# Patient Record
Sex: FEMALE | Race: OTHER | HISPANIC OR LATINO
[De-identification: names, ages, dates, MRNs, and addresses within clinical notes are randomized per-mention and may not be internally consistent; named-entity substitution may affect disease eponyms.]

---

## 2017-03-17 PROBLEM — Z00.00 ENCOUNTER FOR PREVENTIVE HEALTH EXAMINATION: Status: ACTIVE | Noted: 2017-03-17

## 2017-03-31 ENCOUNTER — APPOINTMENT (OUTPATIENT)
Dept: UROGYNECOLOGY | Facility: CLINIC | Age: 67
End: 2017-03-31

## 2017-03-31 ENCOUNTER — APPOINTMENT (OUTPATIENT)
Dept: OBGYN | Facility: CLINIC | Age: 67
End: 2017-03-31

## 2017-03-31 VITALS
HEART RATE: 76 BPM | DIASTOLIC BLOOD PRESSURE: 70 MMHG | SYSTOLIC BLOOD PRESSURE: 130 MMHG | HEIGHT: 66 IN | WEIGHT: 175 LBS | BODY MASS INDEX: 28.12 KG/M2

## 2017-03-31 DIAGNOSIS — Z86.79 PERSONAL HISTORY OF OTHER DISEASES OF THE CIRCULATORY SYSTEM: ICD-10-CM

## 2017-03-31 DIAGNOSIS — R31.29 OTHER MICROSCOPIC HEMATURIA: ICD-10-CM

## 2017-03-31 PROBLEM — Z00.00 ENCOUNTER FOR PREVENTIVE HEALTH EXAMINATION: Noted: 2017-03-31

## 2017-03-31 LAB
BILIRUB UR QL STRIP: NORMAL
CLARITY UR: CLEAR
COLLECTION METHOD: NORMAL
GLUCOSE UR-MCNC: 250
HCG UR QL: 0.2 EU/DL
HGB UR QL STRIP.AUTO: NORMAL
KETONES UR-MCNC: NORMAL
LEUKOCYTE ESTERASE UR QL STRIP: NORMAL
NITRITE UR QL STRIP: NORMAL
PH UR STRIP: 7
PROT UR STRIP-MCNC: NORMAL
SP GR UR STRIP: 1.02

## 2017-03-31 RX ORDER — LOSARTAN POTASSIUM AND HYDROCHLOROTHIAZIDE 100; 25 MG/1; MG/1
100-25 TABLET, FILM COATED ORAL
Refills: 0 | Status: ACTIVE | COMMUNITY

## 2017-03-31 RX ORDER — SIMVASTATIN 20 MG/1
20 TABLET, FILM COATED ORAL
Refills: 0 | Status: ACTIVE | COMMUNITY

## 2017-03-31 RX ORDER — ASPIRIN 81 MG
81 TABLET, DELAYED RELEASE (ENTERIC COATED) ORAL
Refills: 0 | Status: ACTIVE | COMMUNITY

## 2017-03-31 RX ORDER — METOPROLOL TARTRATE 100 MG/1
100 TABLET, FILM COATED ORAL
Refills: 0 | Status: ACTIVE | COMMUNITY

## 2017-04-03 ENCOUNTER — RESULT REVIEW (OUTPATIENT)
Age: 67
End: 2017-04-03

## 2017-04-03 LAB
APPEARANCE: CLEAR
BACTERIA: NEGATIVE
BILIRUBIN URINE: NEGATIVE
BLOOD URINE: ABNORMAL
COLOR: YELLOW
GLUCOSE QUALITATIVE U: 250 MG/DL
HYALINE CASTS: 3 /LPF
KETONES URINE: NEGATIVE
LEUKOCYTE ESTERASE URINE: NEGATIVE
MICROSCOPIC-UA: NORMAL
NITRITE URINE: NEGATIVE
PH URINE: 7.5
PROTEIN URINE: NEGATIVE MG/DL
RED BLOOD CELLS URINE: 1 /HPF
SPECIFIC GRAVITY URINE: 1.02
SQUAMOUS EPITHELIAL CELLS: 1 /HPF
UROBILINOGEN URINE: NORMAL MG/DL
WHITE BLOOD CELLS URINE: 0 /HPF

## 2017-04-25 ENCOUNTER — APPOINTMENT (OUTPATIENT)
Dept: OBGYN | Facility: CLINIC | Age: 67
End: 2017-04-25

## 2017-05-09 ENCOUNTER — APPOINTMENT (OUTPATIENT)
Dept: UROGYNECOLOGY | Facility: CLINIC | Age: 67
End: 2017-05-09

## 2017-05-16 ENCOUNTER — APPOINTMENT (OUTPATIENT)
Dept: UROGYNECOLOGY | Facility: CLINIC | Age: 67
End: 2017-05-16

## 2017-06-13 ENCOUNTER — APPOINTMENT (OUTPATIENT)
Dept: DERMATOLOGY | Facility: CLINIC | Age: 67
End: 2017-06-13

## 2017-06-16 ENCOUNTER — APPOINTMENT (OUTPATIENT)
Dept: UROGYNECOLOGY | Facility: CLINIC | Age: 67
End: 2017-06-16

## 2017-09-12 ENCOUNTER — MESSAGE (OUTPATIENT)
Age: 67
End: 2017-09-12

## 2017-09-15 ENCOUNTER — APPOINTMENT (OUTPATIENT)
Dept: UROGYNECOLOGY | Facility: CLINIC | Age: 67
End: 2017-09-15
Payer: MEDICARE

## 2017-09-15 PROCEDURE — 99213 OFFICE O/P EST LOW 20 MIN: CPT

## 2017-12-15 ENCOUNTER — APPOINTMENT (OUTPATIENT)
Dept: UROGYNECOLOGY | Facility: CLINIC | Age: 67
End: 2017-12-15

## 2017-12-19 ENCOUNTER — APPOINTMENT (OUTPATIENT)
Dept: OBGYN | Facility: CLINIC | Age: 67
End: 2017-12-19

## 2017-12-21 ENCOUNTER — LABORATORY RESULT (OUTPATIENT)
Age: 67
End: 2017-12-21

## 2017-12-21 ENCOUNTER — OUTPATIENT (OUTPATIENT)
Dept: OUTPATIENT SERVICES | Facility: HOSPITAL | Age: 67
LOS: 1 days | End: 2017-12-21
Payer: MEDICARE

## 2017-12-21 ENCOUNTER — APPOINTMENT (OUTPATIENT)
Dept: OBGYN | Facility: CLINIC | Age: 67
End: 2017-12-21
Payer: MEDICARE

## 2017-12-21 VITALS — DIASTOLIC BLOOD PRESSURE: 98 MMHG | WEIGHT: 174 LBS | BODY MASS INDEX: 28.08 KG/M2 | SYSTOLIC BLOOD PRESSURE: 170 MMHG

## 2017-12-21 VITALS — DIASTOLIC BLOOD PRESSURE: 90 MMHG | SYSTOLIC BLOOD PRESSURE: 148 MMHG

## 2017-12-21 DIAGNOSIS — N76.0 ACUTE VAGINITIS: ICD-10-CM

## 2017-12-21 PROCEDURE — 87624 HPV HI-RISK TYP POOLED RSLT: CPT

## 2017-12-21 PROCEDURE — G0463: CPT

## 2017-12-21 PROCEDURE — 99203 OFFICE O/P NEW LOW 30 MIN: CPT | Mod: 25,NC

## 2017-12-22 LAB — HPV HIGH+LOW RISK DNA PNL CVX: SIGNIFICANT CHANGE UP

## 2017-12-26 LAB — CYTOLOGY SPEC DOC CYTO: SIGNIFICANT CHANGE UP

## 2017-12-29 DIAGNOSIS — Z46.89 ENCOUNTER FOR FITTING AND ADJUSTMENT OF OTHER SPECIFIED DEVICES: ICD-10-CM

## 2017-12-29 DIAGNOSIS — Z01.419 ENCOUNTER FOR GYNECOLOGICAL EXAMINATION (GENERAL) (ROUTINE) WITHOUT ABNORMAL FINDINGS: ICD-10-CM

## 2017-12-29 DIAGNOSIS — Z78.0 ASYMPTOMATIC MENOPAUSAL STATE: ICD-10-CM

## 2018-04-21 ENCOUNTER — EMERGENCY (EMERGENCY)
Facility: HOSPITAL | Age: 68
LOS: 1 days | Discharge: HOME | End: 2018-04-21
Attending: EMERGENCY MEDICINE | Admitting: EMERGENCY MEDICINE
Payer: MEDICARE

## 2018-04-21 VITALS
OXYGEN SATURATION: 100 % | TEMPERATURE: 98 F | SYSTOLIC BLOOD PRESSURE: 171 MMHG | DIASTOLIC BLOOD PRESSURE: 81 MMHG | RESPIRATION RATE: 16 BRPM | HEART RATE: 86 BPM

## 2018-04-21 LAB
ALBUMIN SERPL ELPH-MCNC: 4.2 G/DL — SIGNIFICANT CHANGE UP (ref 3.3–5)
ALP SERPL-CCNC: 98 U/L — SIGNIFICANT CHANGE UP (ref 40–120)
ALT FLD-CCNC: 34 U/L — HIGH (ref 4–33)
AST SERPL-CCNC: 36 U/L — HIGH (ref 4–32)
BASE EXCESS BLDV CALC-SCNC: 5 MMOL/L — SIGNIFICANT CHANGE UP
BASOPHILS # BLD AUTO: 0.06 K/UL — SIGNIFICANT CHANGE UP (ref 0–0.2)
BASOPHILS NFR BLD AUTO: 0.8 % — SIGNIFICANT CHANGE UP (ref 0–2)
BILIRUB SERPL-MCNC: 0.5 MG/DL — SIGNIFICANT CHANGE UP (ref 0.2–1.2)
BLOOD GAS VENOUS - CREATININE: 0.58 MG/DL — SIGNIFICANT CHANGE UP (ref 0.5–1.3)
BUN SERPL-MCNC: 13 MG/DL — SIGNIFICANT CHANGE UP (ref 7–23)
CALCIUM SERPL-MCNC: 9.4 MG/DL — SIGNIFICANT CHANGE UP (ref 8.4–10.5)
CHLORIDE BLDV-SCNC: 103 MMOL/L — SIGNIFICANT CHANGE UP (ref 96–108)
CHLORIDE SERPL-SCNC: 94 MMOL/L — LOW (ref 98–107)
CO2 SERPL-SCNC: 26 MMOL/L — SIGNIFICANT CHANGE UP (ref 22–31)
CREAT SERPL-MCNC: 0.61 MG/DL — SIGNIFICANT CHANGE UP (ref 0.5–1.3)
EOSINOPHIL # BLD AUTO: 0.27 K/UL — SIGNIFICANT CHANGE UP (ref 0–0.5)
EOSINOPHIL NFR BLD AUTO: 3.7 % — SIGNIFICANT CHANGE UP (ref 0–6)
GAS PNL BLDV: 130 MMOL/L — LOW (ref 136–146)
GLUCOSE BLDV-MCNC: 271 — HIGH (ref 70–99)
GLUCOSE SERPL-MCNC: 279 MG/DL — HIGH (ref 70–99)
HBA1C BLD-MCNC: 10.2 % — HIGH (ref 4–5.6)
HCO3 BLDV-SCNC: 27 MMOL/L — SIGNIFICANT CHANGE UP (ref 20–27)
HCT VFR BLD CALC: 41.7 % — SIGNIFICANT CHANGE UP (ref 34.5–45)
HCT VFR BLDV CALC: 43.5 % — SIGNIFICANT CHANGE UP (ref 34.5–45)
HGB BLD-MCNC: 13.9 G/DL — SIGNIFICANT CHANGE UP (ref 11.5–15.5)
HGB BLDV-MCNC: 14.2 G/DL — SIGNIFICANT CHANGE UP (ref 11.5–15.5)
IMM GRANULOCYTES # BLD AUTO: 0.02 # — SIGNIFICANT CHANGE UP
IMM GRANULOCYTES NFR BLD AUTO: 0.3 % — SIGNIFICANT CHANGE UP (ref 0–1.5)
LACTATE BLDV-MCNC: 1.6 MMOL/L — SIGNIFICANT CHANGE UP (ref 0.5–2)
LYMPHOCYTES # BLD AUTO: 2.05 K/UL — SIGNIFICANT CHANGE UP (ref 1–3.3)
LYMPHOCYTES # BLD AUTO: 28.1 % — SIGNIFICANT CHANGE UP (ref 13–44)
MCHC RBC-ENTMCNC: 29.2 PG — SIGNIFICANT CHANGE UP (ref 27–34)
MCHC RBC-ENTMCNC: 33.3 % — SIGNIFICANT CHANGE UP (ref 32–36)
MCV RBC AUTO: 87.6 FL — SIGNIFICANT CHANGE UP (ref 80–100)
MONOCYTES # BLD AUTO: 0.48 K/UL — SIGNIFICANT CHANGE UP (ref 0–0.9)
MONOCYTES NFR BLD AUTO: 6.6 % — SIGNIFICANT CHANGE UP (ref 2–14)
NEUTROPHILS # BLD AUTO: 4.42 K/UL — SIGNIFICANT CHANGE UP (ref 1.8–7.4)
NEUTROPHILS NFR BLD AUTO: 60.5 % — SIGNIFICANT CHANGE UP (ref 43–77)
NRBC # FLD: 0 — SIGNIFICANT CHANGE UP
PCO2 BLDV: 47 MMHG — SIGNIFICANT CHANGE UP (ref 41–51)
PH BLDV: 7.41 PH — SIGNIFICANT CHANGE UP (ref 7.32–7.43)
PLATELET # BLD AUTO: 245 K/UL — SIGNIFICANT CHANGE UP (ref 150–400)
PMV BLD: 10.9 FL — SIGNIFICANT CHANGE UP (ref 7–13)
PO2 BLDV: 30 MMHG — LOW (ref 35–40)
POTASSIUM BLDV-SCNC: 3.3 MMOL/L — LOW (ref 3.4–4.5)
POTASSIUM SERPL-MCNC: 3.6 MMOL/L — SIGNIFICANT CHANGE UP (ref 3.5–5.3)
POTASSIUM SERPL-SCNC: 3.6 MMOL/L — SIGNIFICANT CHANGE UP (ref 3.5–5.3)
PROT SERPL-MCNC: 7.4 G/DL — SIGNIFICANT CHANGE UP (ref 6–8.3)
RBC # BLD: 4.76 M/UL — SIGNIFICANT CHANGE UP (ref 3.8–5.2)
RBC # FLD: 11.9 % — SIGNIFICANT CHANGE UP (ref 10.3–14.5)
SAO2 % BLDV: 53.2 % — LOW (ref 60–85)
SODIUM SERPL-SCNC: 134 MMOL/L — LOW (ref 135–145)
WBC # BLD: 7.3 K/UL — SIGNIFICANT CHANGE UP (ref 3.8–10.5)
WBC # FLD AUTO: 7.3 K/UL — SIGNIFICANT CHANGE UP (ref 3.8–10.5)

## 2018-04-21 PROCEDURE — 73630 X-RAY EXAM OF FOOT: CPT | Mod: 26,RT

## 2018-04-21 PROCEDURE — 93971 EXTREMITY STUDY: CPT | Mod: 26,LT

## 2018-04-21 PROCEDURE — 73610 X-RAY EXAM OF ANKLE: CPT | Mod: 26,RT

## 2018-04-21 PROCEDURE — 99284 EMERGENCY DEPT VISIT MOD MDM: CPT

## 2018-04-21 NOTE — ED PROVIDER NOTE - OBJECTIVE STATEMENT
67 year old female with at PMHx of HTN and high cholestrol, presents to the ED with right foot pain x 1 week. The patient notes that she has a varicose vein procedure 2 years ago in her right foot and had no issues or pain until one week ago. She has intense cramping in her right foot and feels there is a "liquid" radiating up her leg. She denies fever, chills, nausea, vomiting, numbness, tingling, weakness, or any other complaint. The patient is a native Mexican speaker and is having her daughter, Yolli Reyes, sly. She takes metoprolol and simvastatin. nifedipine, losartan-hydrochlorothiazide, and aspirin.

## 2018-04-21 NOTE — ED PROVIDER NOTE - PLAN OF CARE
Follow up with Dr. Stahl on Monday to discuss the results of your blood tests and elevated blood sugar. Call out admin PA between the hours of 8:30-3:00pm for the results of the HGA1C at [phone]. Take Tylenol 650mg every 4-6 hours as needed for pain. Increase your fluids. Worsening, continued or ANY new concerning symptoms return to the emergency department.

## 2018-04-21 NOTE — ED PROVIDER NOTE - PROGRESS NOTE DETAILS
JESSICA SHIELDS TIBERIO- US negative for DVT. XRAYs negative for acute fracture- incidental osteophytes noted. Glucose noted to be 271. No gap. Copies of reports given to pt. I spoke with PMD Dr. Hung Stahl [phone]. HGA1C added. Dr. Stahl will see patient Monday mornign to start treatment based on HGA1C results. Pt given admin PA spectra link to call Monday morning when she is at the doctors office. I also left message on the [id number] line for admin PA to follow up on. JESSICA SHIELDS TIBERIO- US negative for DVT. XRAYs negative for acute fracture- incidental osteophytes noted. Glucose noted to be 271. No gap. Copies of reports given to pt. I spoke with PMD Dr. Hung Stahl [phone]. HGA1C added. Dr. Stahl will see patient Monday mornign to start treatment based on HGA1C results. Pt given admin PA spectra link to call Monday morning when she is at the doctors office. I also left message on the [id number] line for admin PA to follow up on. Diet modifications discussed in great detail and DM education given. JESSICA SHIELDS TIBERIO- US negative for DVT. XRAYs negative for acute fracture- incidental osteophytes noted. Glucose noted to be 271.  Copies of reports given to pt. I spoke with PMD Dr. Hung Stahl [phone]. HGA1C added. Dr. Stahl will see patient Monday mornign to start treatment based on HGA1C results. Pt given admin PA spectra link to call tomorrow for test results. I also left message on the [id number] line for admin PA to follow up on. Diet modifications discussed in great detail, exercise, increase fluids and DM education given.

## 2018-04-21 NOTE — ED PROVIDER NOTE - CARE PLAN
Principal Discharge DX:	Pain of right lower extremity  Assessment and plan of treatment:	Follow up with Dr. Stahl on Monday to discuss the results of your blood tests and elevated blood sugar. Call out admin PA between the hours of 8:30-3:00pm for the results of the HGA1C at [phone]. Take Tylenol 650mg every 4-6 hours as needed for pain. Increase your fluids. Worsening, continued or ANY new concerning symptoms return to the emergency department.  Secondary Diagnosis:	Hyperglycemia

## 2018-04-21 NOTE — ED PROVIDER NOTE - MEDICAL DECISION MAKING DETAILS
67 year old female with right leg pain, mostly in ankle and foot with varicose veins. No deformity. Will get x-ray, pain medication, and ultrasound to rule out DVT. Patient has cramps so will check electrolytes. Will likely d/c home with PMD follow up.

## 2018-04-22 NOTE — ED POST DISCHARGE NOTE - REASON FOR FOLLOW-UP
Other Telephone follow up  request : CORNELIUS Goff requesting we follow up with patient who is a 67yof with PMHX of HTN p/w leg cramping. No HX of DM patient with a serum glucose of 279. CORNELIUS Goff added on HGBA1C. Contact patient with results of HgbA1C :10.2. CORNELIUS Goff conatcted PMD Dr Stahl  and patient has appt on Monday (4/23). Fax results to Dr Stahl on Monday.

## 2018-05-15 ENCOUNTER — OUTPATIENT (OUTPATIENT)
Dept: OUTPATIENT SERVICES | Facility: HOSPITAL | Age: 68
LOS: 1 days | End: 2018-05-15
Payer: MEDICARE

## 2018-05-15 ENCOUNTER — APPOINTMENT (OUTPATIENT)
Dept: OBGYN | Facility: CLINIC | Age: 68
End: 2018-05-15
Payer: MEDICARE

## 2018-05-15 DIAGNOSIS — Z01.419 ENCOUNTER FOR GYNECOLOGICAL EXAMINATION (GENERAL) (ROUTINE) W/OUT ABNORMAL FINDINGS: ICD-10-CM

## 2018-05-15 DIAGNOSIS — N76.0 ACUTE VAGINITIS: ICD-10-CM

## 2018-05-15 PROCEDURE — 57160 INSERT PESSARY/OTHER DEVICE: CPT | Mod: NC

## 2018-05-15 PROCEDURE — 99213 OFFICE O/P EST LOW 20 MIN: CPT | Mod: 25,NC

## 2018-05-15 PROCEDURE — G0463: CPT

## 2018-05-18 DIAGNOSIS — Z01.419 ENCOUNTER FOR GYNECOLOGICAL EXAMINATION (GENERAL) (ROUTINE) WITHOUT ABNORMAL FINDINGS: ICD-10-CM

## 2018-05-18 DIAGNOSIS — Z46.89 ENCOUNTER FOR FITTING AND ADJUSTMENT OF OTHER SPECIFIED DEVICES: ICD-10-CM

## 2018-09-14 ENCOUNTER — APPOINTMENT (OUTPATIENT)
Dept: OBGYN | Facility: CLINIC | Age: 68
End: 2018-09-14
Payer: MEDICARE

## 2018-09-14 ENCOUNTER — OUTPATIENT (OUTPATIENT)
Dept: OUTPATIENT SERVICES | Facility: HOSPITAL | Age: 68
LOS: 1 days | End: 2018-09-14
Payer: MEDICARE

## 2018-09-14 VITALS — BODY MASS INDEX: 24.53 KG/M2 | WEIGHT: 152 LBS | SYSTOLIC BLOOD PRESSURE: 160 MMHG | DIASTOLIC BLOOD PRESSURE: 98 MMHG

## 2018-09-14 DIAGNOSIS — N76.0 ACUTE VAGINITIS: ICD-10-CM

## 2018-09-14 PROCEDURE — 99213 OFFICE O/P EST LOW 20 MIN: CPT

## 2018-09-14 PROCEDURE — G0463: CPT

## 2018-09-14 RX ORDER — ESTRADIOL 0.1 MG/G
0.1 CREAM VAGINAL
Qty: 1 | Refills: 1 | Status: ACTIVE | COMMUNITY
Start: 2018-09-14 | End: 1900-01-01

## 2018-09-24 DIAGNOSIS — Z46.89 ENCOUNTER FOR FITTING AND ADJUSTMENT OF OTHER SPECIFIED DEVICES: ICD-10-CM

## 2018-10-19 ENCOUNTER — APPOINTMENT (OUTPATIENT)
Dept: UROGYNECOLOGY | Facility: CLINIC | Age: 68
End: 2018-10-19
Payer: MEDICARE

## 2018-10-19 ENCOUNTER — APPOINTMENT (OUTPATIENT)
Dept: OBGYN | Facility: CLINIC | Age: 68
End: 2018-10-19

## 2018-10-19 PROCEDURE — A4562: CPT

## 2018-10-19 PROCEDURE — 99213 OFFICE O/P EST LOW 20 MIN: CPT

## 2018-11-07 ENCOUNTER — APPOINTMENT (OUTPATIENT)
Dept: OBGYN | Facility: CLINIC | Age: 68
End: 2018-11-07

## 2018-11-23 ENCOUNTER — APPOINTMENT (OUTPATIENT)
Dept: UROGYNECOLOGY | Facility: CLINIC | Age: 68
End: 2018-11-23

## 2019-06-10 ENCOUNTER — APPOINTMENT (OUTPATIENT)
Dept: UROGYNECOLOGY | Facility: CLINIC | Age: 69
End: 2019-06-10
Payer: MEDICARE

## 2019-06-10 PROCEDURE — 99213 OFFICE O/P EST LOW 20 MIN: CPT

## 2019-06-11 NOTE — PROCEDURE
[Good Fit] : fits well [Pessary Washed] : washed [H2O] : H2O [None] : no bleeding [Refit] : refit is not needed [Erosion] : no evidence of erosion [Erythema] : no erythema [Discharge] : no vaginal discharge [Infection] : no evidence of infection [FreeTextEntry8] : routine brittany-care [FreeTextEntry1] : RS #3

## 2019-06-11 NOTE — HISTORY OF PRESENT ILLNESS
[FreeTextEntry1] : Pt presents to office for f/u of prolapse.  Denies any issues with pessary. LOV was 10/2018.

## 2019-06-11 NOTE — PHYSICAL EXAM
[No Acute Distress] : in no acute distress [Well developed] : well developed [Well Nourished] : ~L well nourished [Good Hygeine] : demonstrates good hygeine [Normal Memory] : ~T memory was ~L unimpaired [Oriented x3] : oriented to person, place, and time [Normal Mood/Affect] : mood and affect are normal [Warm and Dry] : was warm and dry to touch [Normal Gait] : gait was normal [Labia Majora] : were normal [Labia Minora] : were normal [Normal Appearance] : general appearance was normal [Normal] : was normal [Atrophy] : atrophy [No Bleeding] : there was no active vaginal bleeding [Anxiety] : patient is not anxious [Distended] : not distended [Tenderness] : ~T no ~M abdominal tenderness observed

## 2019-06-11 NOTE — DISCUSSION/SUMMARY
[FreeTextEntry1] : Pt doing well with pessary. She will RTO in 3 months or sooner if needed. Pt agrees to call office with any problems/concerns

## 2019-09-13 ENCOUNTER — APPOINTMENT (OUTPATIENT)
Dept: UROGYNECOLOGY | Facility: CLINIC | Age: 69
End: 2019-09-13

## 2019-11-01 ENCOUNTER — APPOINTMENT (OUTPATIENT)
Dept: UROGYNECOLOGY | Facility: CLINIC | Age: 69
End: 2019-11-01
Payer: MEDICARE

## 2019-11-01 DIAGNOSIS — N90.7 VULVAR CYST: ICD-10-CM

## 2019-11-01 DIAGNOSIS — Z78.0 ASYMPTOMATIC MENOPAUSAL STATE: ICD-10-CM

## 2019-11-01 PROCEDURE — 99214 OFFICE O/P EST MOD 30 MIN: CPT

## 2019-11-01 NOTE — DISCUSSION/SUMMARY
[FreeTextEntry1] : Pt doing well with pessary.\par \par  She was informed of sebaceous cysts.  Advised there are no further actions unless they present with new or worsening symptoms. If she would like them removed she can see a GYN or dermatologist. She declines at this time, as it is not bothersome now.  Otherwise she will RTO in 3 months or sooner if needed. She agrees to call office with any problems/concerns.

## 2019-11-01 NOTE — REASON FOR VISIT
[Family Member] : family member [Patient Declined  Services] : - None: Patient declined  services [FreeTextEntry3] : daughter to translate

## 2019-11-01 NOTE — PROCEDURE
[Good Fit] : fits well [Pessary Washed] : washed [H2O] : H2O [None] : no bleeding [Refit] : refit is not needed [Erosion] : no evidence of erosion [Erythema] : no erythema [Discharge] : no vaginal discharge [Infection] : no evidence of infection [FreeTextEntry1] : RS #3 [FreeTextEntry8] : routine brittany-care

## 2019-11-01 NOTE — PHYSICAL EXAM
[No Acute Distress] : in no acute distress [Well developed] : well developed [Well Nourished] : ~L well nourished [Good Hygeine] : demonstrates good hygeine [Oriented x3] : oriented to person, place, and time [Normal Memory] : ~T memory was ~L unimpaired [Normal Mood/Affect] : mood and affect are normal [Warm and Dry] : was warm and dry to touch [Normal Gait] : gait was normal [Labia Majora] : was normal on the left [Labia Minora] : were normal [Normal] : was normal [Normal Appearance] : general appearance was normal [Atrophy] : atrophy [No Bleeding] : there was no active vaginal bleeding [Anxiety] : patient is not anxious [Tenderness] : ~T no ~M abdominal tenderness observed [Distended] : not distended [de-identified] : 3 sebaceous cysts approx 0.5 cm each noted on right labia. denies that it is painful, itchy, no discharge and cannot drain

## 2020-02-07 ENCOUNTER — APPOINTMENT (OUTPATIENT)
Dept: UROGYNECOLOGY | Facility: CLINIC | Age: 70
End: 2020-02-07
Payer: MEDICARE

## 2020-02-07 PROCEDURE — 99213 OFFICE O/P EST LOW 20 MIN: CPT

## 2020-02-07 NOTE — PHYSICAL EXAM
[No Acute Distress] : in no acute distress [Good Hygeine] : demonstrates good hygeine [Well Nourished] : ~L well nourished [Soft, Nontender] : the abdomen was soft and nontender [Normal Mood/Affect] : mood and affect are normal [Oriented x3] : oriented to person, place, and time [Warm and Dry] : was warm and dry to touch [Normal Gait] : gait was normal [Vulvar Atrophy] : vulvar atrophy [Atrophy] : atrophy [Cystocele] : a cystocele [Scant] : scant [Discharge] : a  ~M vaginal discharge was present [Clear] : clear [No Bleeding] : there was no active vaginal bleeding

## 2020-02-07 NOTE — REASON FOR VISIT
[Follow-up Visit ___] : a follow-up visit  for [unfilled] [Source: ______] : History obtained from [unfilled] [Family Member] : family member

## 2020-02-10 NOTE — PROCEDURE
[Good Fit] : fits well [Discharge] : there is vaginal discharge [Pessary Inserted] : inserted [Pessary Washed] : washed [H2O] : H2O [None] : no bleeding [Medication Review] : Medicaiton Review: Patient verbalizes understanding of risks and benefits [Erythema] : no erythema [Infection] : no evidence of infection [FreeTextEntry3] : Estrace cream [FreeTextEntry1] : Ring with support # 3 [FreeTextEntry8] : Reinforced daily pericare.

## 2020-02-10 NOTE — DISCUSSION/SUMMARY
[FreeTextEntry1] : Follow up in 3 months.  IF pt have any problems/concern to call office.  PT awrae and agrees.

## 2020-02-10 NOTE — HISTORY OF PRESENT ILLNESS
[FreeTextEntry1] : Pt presents to the office with daughter.   Pt have been doing well with pessary.  Denies any pain, discomfort, or vaginal bleeding.  Voiding and moving BM without problems. Using Estrace cream.\par

## 2020-06-19 ENCOUNTER — APPOINTMENT (OUTPATIENT)
Dept: UROGYNECOLOGY | Facility: CLINIC | Age: 70
End: 2020-06-19
Payer: MEDICARE

## 2020-06-19 PROCEDURE — 99213 OFFICE O/P EST LOW 20 MIN: CPT

## 2020-06-22 NOTE — PROCEDURE
[Discharge] : there is vaginal discharge [Good Fit] : fits well [Pessary Washed] : washed [Pessary Inserted] : inserted [None] : no bleeding [H2O] : H2O [Medication Review] : Medicaiton Review: Patient verbalizes understanding of risks and benefits [Erythema] : no erythema [Infection] : no evidence of infection [FreeTextEntry1] : Ring with support # 3 [FreeTextEntry3] : Estrace cream [FreeTextEntry8] : Reinforced daily pericare.

## 2020-06-22 NOTE — DISCUSSION/SUMMARY
[FreeTextEntry1] : Follow up in 3 months or sooner if needed.  IF pt have any problems/concern to call office.  PT aware and agrees.

## 2020-06-22 NOTE — PHYSICAL EXAM
[Well developed] : well developed [No Acute Distress] : in no acute distress [Oriented x3] : oriented to person, place, and time [Good Hygeine] : demonstrates good hygeine [Well Nourished] : ~L well nourished [Normal Memory] : ~T memory was ~L unimpaired [Normal Mood/Affect] : mood and affect are normal [Soft, Nontender] : the abdomen was soft and nontender [Warm and Dry] : was warm and dry to touch [Normal Gait] : gait was normal [Vulvar Atrophy] : vulvar atrophy [Labia Majora] : were normal [Labia Minora] : were normal [Normal] : was normal [Atrophy] : atrophy [Cystocele] : a cystocele [Discharge] : a  ~M vaginal discharge was present [Clear] : clear [Scant] : scant [No Bleeding] : there was no active vaginal bleeding [Anxiety] : patient is not anxious

## 2020-08-08 ENCOUNTER — EMERGENCY (EMERGENCY)
Facility: HOSPITAL | Age: 70
LOS: 1 days | Discharge: HOME | End: 2020-08-08
Attending: EMERGENCY MEDICINE | Admitting: EMERGENCY MEDICINE
Payer: MEDICARE

## 2020-08-08 VITALS
OXYGEN SATURATION: 100 % | HEART RATE: 67 BPM | TEMPERATURE: 98 F | SYSTOLIC BLOOD PRESSURE: 137 MMHG | DIASTOLIC BLOOD PRESSURE: 60 MMHG | RESPIRATION RATE: 18 BRPM

## 2020-08-08 VITALS
SYSTOLIC BLOOD PRESSURE: 167 MMHG | TEMPERATURE: 97 F | RESPIRATION RATE: 18 BRPM | HEART RATE: 80 BPM | OXYGEN SATURATION: 100 % | DIASTOLIC BLOOD PRESSURE: 73 MMHG

## 2020-08-08 PROCEDURE — 73590 X-RAY EXAM OF LOWER LEG: CPT | Mod: 26,LT

## 2020-08-08 PROCEDURE — 99284 EMERGENCY DEPT VISIT MOD MDM: CPT

## 2020-08-08 PROCEDURE — 93971 EXTREMITY STUDY: CPT | Mod: 26,LT

## 2020-08-08 RX ORDER — ACETAMINOPHEN 500 MG
975 TABLET ORAL ONCE
Refills: 0 | Status: COMPLETED | OUTPATIENT
Start: 2020-08-08 | End: 2020-08-08

## 2020-08-08 RX ORDER — OXYCODONE HYDROCHLORIDE 5 MG/1
1 TABLET ORAL
Qty: 12 | Refills: 0
Start: 2020-08-08 | End: 2020-08-10

## 2020-08-08 NOTE — ED PROVIDER NOTE - NSFOLLOWUPINSTRUCTIONS_ED_ALL_ED_FT
Rest, ice, elevate area.   Follow up with Vascular Surgery      Take Oxycodone 5mg once every 6 hours as needed for severe pain -- causes drowsiness; DO NOT drink alcohol, drive, or operate heavy machinery with this medication.       Any worsening pain, swelling, weakness, numbness or any NEW CONCERNING symptoms return to the Emergency Dept.

## 2020-08-08 NOTE — ED PROVIDER NOTE - CLINICAL SUMMARY MEDICAL DECISION MAKING FREE TEXT BOX
71 y/o female with pmhx of "blood clot in right leg" s/p surgery to remove clot 6 years ago, not on A/C, DM not on insulin, presents to ED c/o left LE pain x months, worse today. Intermittent pain. Describes "cramping." Not worse with walking. Denies any exercise or lifting. States she would like pain medication and that's why she came. States she has appointment with vascular doctor in 12 days. pt in NAD, well appearing, no le edema or calf tenderness on exam. neurovascularly intact. possible PVD. plan to check XR, US r/o dvt, fingerstick, pain control and refer to vascular surgery outpatient.

## 2020-08-08 NOTE — ED PROVIDER NOTE - PATIENT PORTAL LINK FT
You can access the FollowMyHealth Patient Portal offered by [facility] by registering at the following website: [email or web address]. By joining [employer]’s FollowMyHealth portal, you will also be able to view your health information using other applications (apps) compatible with our system.

## 2020-08-08 NOTE — ED PROVIDER NOTE - PROGRESS NOTE DETAILS
Pt refusing to take tylenol. will provide oxycodone for home, aware not to drive. daughter picking her up.

## 2020-08-08 NOTE — ED PROVIDER NOTE - OBJECTIVE STATEMENT
English speaking  ID# [id number]:  69 y/o female with pmhx of "blood clot in right leg" s/p surgery to remove clot 6 years ago, no A/C, DM not on insulin, presents to ED c/o left LE pain x months, worse today. Intermittent pain. Describes "cramping." Not worse with walking. States she would like pain medication and that's why she came. States she has appointment with vascular doctor in 12 days. No fever, chills, cp, sob, weakness, numbness, tingling, Slovak speaking  ID# [id number]:  71 y/o female with pmhx of "blood clot in right leg" s/p surgery to remove clot 6 years ago, not on A/C, DM not on insulin, presents to ED c/o left LE pain x months, worse today. Intermittent pain. Describes "cramping." Not worse with walking. Denies any exercise or lifting. States she would like pain medication and that's why she came. States she has appointment with vascular doctor in 12 days. No fever, chills, cp, sob, weakness, numbness, tingling.

## 2020-08-08 NOTE — ED PROVIDER NOTE - ATTENDING CONTRIBUTION TO CARE
DR. ARCE, ATTENDING MD-  I performed a face to face bedside interview with the patient regarding history of present illness, review of symptoms and past medical history. I completed an independent physical exam.  I have discussed the patient's plan of care with the PA.   Documentation as above in the note.    69 y/o female h/o varicose veins not on ac here with left calf pain.  No trauma, no numbness/tingling/weakness.  Left calf without swelling or erythema, mildly ttp.  Evaluate for dvt vs bony abn, more likely calf strain.  Obtain venous duplex, xr, give pain med, antic dc with pcp f/u as o/p.

## 2020-08-08 NOTE — ED ADULT TRIAGE NOTE - CHIEF COMPLAINT QUOTE
Pt suffers from varicose veins in her legs and is c/o lt leg shin pain--on and off for years . Pt states the pain in leg is worse and she took tylenol and it didn't help

## 2020-08-08 NOTE — ED PROVIDER NOTE - CARDIAC, MLM
Normal rate, regular rhythm.  Heart sounds S1, S2.  No murmurs, rubs or gallops. No LE edema. No calf tenderness. No bony tenderness. Full rom of all joints. Pulses 2+. No skin changes.

## 2020-08-08 NOTE — ED ADULT NURSE NOTE - OBJECTIVE STATEMENT
pt presents with c/o lt lower leg pain, speaks very few words of English,  phone use with CORNELIUS JUNG, no diffficulty ambulating

## 2020-08-21 ENCOUNTER — APPOINTMENT (OUTPATIENT)
Dept: VASCULAR SURGERY | Facility: CLINIC | Age: 70
End: 2020-08-21
Payer: MEDICARE

## 2020-08-21 VITALS
BODY MASS INDEX: 24.43 KG/M2 | DIASTOLIC BLOOD PRESSURE: 74 MMHG | SYSTOLIC BLOOD PRESSURE: 138 MMHG | HEIGHT: 66 IN | WEIGHT: 152 LBS | HEART RATE: 79 BPM

## 2020-08-21 VITALS — TEMPERATURE: 97.7 F

## 2020-08-21 DIAGNOSIS — I83.90 ASYMPTOMATIC VARICOSE VEINS OF UNSPECIFIED LOWER EXTREMITY: ICD-10-CM

## 2020-08-21 DIAGNOSIS — Z86.39 PERSONAL HISTORY OF OTHER ENDOCRINE, NUTRITIONAL AND METABOLIC DISEASE: ICD-10-CM

## 2020-08-21 PROCEDURE — 93970 EXTREMITY STUDY: CPT

## 2020-08-21 PROCEDURE — 99203 OFFICE O/P NEW LOW 30 MIN: CPT

## 2020-08-21 RX ORDER — NIFEDIPINE 30 MG/1
30 TABLET, EXTENDED RELEASE ORAL
Refills: 0 | Status: DISCONTINUED | COMMUNITY
End: 2020-08-21

## 2020-08-21 RX ORDER — METFORMIN HYDROCHLORIDE 625 MG/1
TABLET ORAL
Refills: 0 | Status: ACTIVE | COMMUNITY

## 2020-08-21 NOTE — HISTORY OF PRESENT ILLNESS
[FreeTextEntry1] : 71 yo female with a history of htn, hld, dm presents for evaluation of painful varicose veins in the foot.  pt reports cramping pain in b/l ankles that occurs usually at 2-3 am that wakes her from sleep.  pt states that she has had a few procedures of the right leg varicose veins in the past.  pt denies any history of claudications at this time.

## 2020-08-21 NOTE — PHYSICAL EXAM
[2+] : right 2+ [Varicose Veins Of Lower Extremities] : present [Varicose Veins Of The Right Leg] : of the right leg [Ankle Swelling On The Left] : moderate [No Rash or Lesion] : No rash or lesion [Calm] : calm [Alert] : alert [JVD] : no jugular venous distention  [Normal Heart Sounds] : normal heart sounds [Normal Breath Sounds] : Normal breath sounds [Ankle Swelling (On Exam)] : not present [Skin Ulcer] : no ulcer [] : not present [Abdomen Masses] : No abdominal masses [de-identified] : appears well  [de-identified] : no calf tenderness\par motor intact b/l lower extremities, muscle strength 5/5 with dorsi and plantar flexion\par  [de-identified] : sensation intact b/l lower extremities

## 2020-09-16 ENCOUNTER — APPOINTMENT (OUTPATIENT)
Dept: UROGYNECOLOGY | Facility: CLINIC | Age: 70
End: 2020-09-16
Payer: MEDICARE

## 2020-09-16 VITALS — TEMPERATURE: 97.1 F

## 2020-09-16 PROCEDURE — 99213 OFFICE O/P EST LOW 20 MIN: CPT

## 2020-09-18 NOTE — PROCEDURE
[Good Fit] : fits well [Discharge] : there is vaginal discharge [Pessary Inserted] : inserted [Pessary Washed] : washed [H2O] : H2O [None] : no bleeding [Medication Review] : Medicaiton Review: Patient verbalizes understanding of risks and benefits [Erythema] : no erythema [Infection] : no evidence of infection [FreeTextEntry1] : Ring with support # 3 [FreeTextEntry3] : Estrace cream [FreeTextEntry8] : Reinforced daily pericare.

## 2020-09-18 NOTE — PHYSICAL EXAM
[No Acute Distress] : in no acute distress [Well developed] : well developed [Well Nourished] : ~L well nourished [Good Hygeine] : demonstrates good hygeine [Normal Memory] : ~T memory was ~L unimpaired [Oriented x3] : oriented to person, place, and time [Normal Mood/Affect] : mood and affect are normal [Warm and Dry] : was warm and dry to touch [Soft, Nontender] : the abdomen was soft and nontender [Normal Gait] : gait was normal [Labia Minora] : were normal [Vulvar Atrophy] : vulvar atrophy [Labia Majora] : were normal [Normal] : was normal [Atrophy] : atrophy [Cystocele] : a cystocele [Discharge] : a  ~M vaginal discharge was present [Clear] : clear [Scant] : scant [No Bleeding] : there was no active vaginal bleeding [Anxiety] : patient is not anxious

## 2020-12-16 PROBLEM — Z01.419 ENCOUNTER FOR GYNECOLOGICAL EXAMINATION: Status: RESOLVED | Noted: 2017-12-21 | Resolved: 2020-12-16

## 2021-01-19 ENCOUNTER — APPOINTMENT (OUTPATIENT)
Dept: UROGYNECOLOGY | Facility: CLINIC | Age: 71
End: 2021-01-19
Payer: MEDICARE

## 2021-01-19 PROCEDURE — 99213 OFFICE O/P EST LOW 20 MIN: CPT

## 2021-01-19 PROCEDURE — 99072 ADDL SUPL MATRL&STAF TM PHE: CPT

## 2021-01-19 NOTE — REASON FOR VISIT
[Follow-Up Visit_____] : a follow-up visit for [unfilled] [Family Member] : family member [Declined  Services] : Patient was offered free  services in ~his/her~ preferred language and declined services. Patient insisted on family member providing interpretation

## 2021-01-20 NOTE — DISCUSSION/SUMMARY
[FreeTextEntry1] : Follow up in 3 months or sooner if needed. \par Patient agrees to call office with any questions or concerns, verbalized understanding.

## 2021-01-20 NOTE — HISTORY OF PRESENT ILLNESS
[FreeTextEntry1] : Patient presents to office for f/u of prolapse. Denies any issues with pessary. Denies urinary or bowel complaints.

## 2021-01-20 NOTE — PHYSICAL EXAM
[No Acute Distress] : in no acute distress [Well developed] : well developed [Well Nourished] : ~L well nourished [Oriented x3] : oriented to person, place, and time [Good Hygeine] : demonstrates good hygeine [Normal Memory] : ~T memory was ~L unimpaired [Normal Mood/Affect] : mood and affect are normal [Soft, Nontender] : the abdomen was soft and nontender [Warm and Dry] : was warm and dry to touch [Normal Gait] : gait was normal [Vulvar Atrophy] : vulvar atrophy [Labia Majora] : were normal [Labia Minora] : were normal [Normal] : was normal [Atrophy] : atrophy [Cystocele] : a cystocele [Discharge] : a  ~M vaginal discharge was present [Scant] : scant [Clear] : clear [No Bleeding] : there was no active vaginal bleeding [Anxiety] : patient is not anxious

## 2021-01-20 NOTE — PROCEDURE
[Good Fit] : fits well [Discharge] : there is vaginal discharge [Pessary Inserted] : inserted [Pessary Washed] : washed [H2O] : H2O [None] : no bleeding [Medication Review] : Medicaiton Review: Patient verbalizes understanding of risks and benefits [Fluid Management] : Fluid Management: patient verbalizes understanding 6-10 cups per day [Bowel Management] : Bowel Management: patient verbalizes understanding of daily dietary fiber intake [Erythema] : no erythema [Infection] : no evidence of infection [FreeTextEntry1] : Ring with support # 3 [FreeTextEntry3] : Estrace cream [FreeTextEntry8] : Reinforced daily pericare.

## 2021-05-26 ENCOUNTER — TRANSCRIPTION ENCOUNTER (OUTPATIENT)
Age: 71
End: 2021-05-26

## 2021-05-26 ENCOUNTER — APPOINTMENT (OUTPATIENT)
Dept: DERMATOLOGY | Facility: CLINIC | Age: 71
End: 2021-05-26
Payer: MEDICARE

## 2021-05-26 ENCOUNTER — LABORATORY RESULT (OUTPATIENT)
Age: 71
End: 2021-05-26

## 2021-05-26 ENCOUNTER — NON-APPOINTMENT (OUTPATIENT)
Age: 71
End: 2021-05-26

## 2021-05-26 PROCEDURE — 11104 PUNCH BX SKIN SINGLE LESION: CPT

## 2021-05-26 PROCEDURE — 99204 OFFICE O/P NEW MOD 45 MIN: CPT | Mod: 25

## 2021-05-26 RX ORDER — BETAMETHASONE DIPROPIONATE 0.5 MG/G
0.05 OINTMENT, AUGMENTED TOPICAL
Qty: 1 | Refills: 0 | Status: ACTIVE | COMMUNITY
Start: 2021-05-26 | End: 1900-01-01

## 2021-06-01 ENCOUNTER — APPOINTMENT (OUTPATIENT)
Dept: UROGYNECOLOGY | Facility: CLINIC | Age: 71
End: 2021-06-01
Payer: MEDICARE

## 2021-06-01 PROCEDURE — 99213 OFFICE O/P EST LOW 20 MIN: CPT

## 2021-06-03 NOTE — DISCUSSION/SUMMARY
[FreeTextEntry1] : Follow up in 3 months or sooner if needed\par Advised avoid constipation/straining w/ daily fiber/fluid intake and stool softeners daily PRN\par Con't daily proper pericare\par Patient agrees to call office with any questions or concerns, verbalized understanding.

## 2021-06-03 NOTE — HISTORY OF PRESENT ILLNESS
[FreeTextEntry1] : Patient w/ known hx POP supported by RS #3 presents to office after 4+ months for f/u of prolapse. Pt forgot about 3 month f/u appt. Denies any issues with pessary. Denies urinary or bowel complaints. Feels empty after voids. Denies any constipation.

## 2021-06-03 NOTE — PROCEDURE
[Good Fit] : fits well [Erythema] : erythema noted [Discharge] : there is vaginal discharge [Pessary Inserted] : inserted [Pessary Washed] : washed [H2O] : H2O [Mild] : mild bleeding [Medication Review] : Medicaiton Review: Patient verbalizes understanding of risks and benefits [Fluid Management] : Fluid Management: patient verbalizes understanding 6-10 cups per day [Bowel Management] : Bowel Management: patient verbalizes understanding of daily dietary fiber intake [Infection] : no evidence of infection [FreeTextEntry1] : Ring with support # 3 [de-identified] : mild at posterior wall [de-identified] : mild at posterior wall w/ removal of pessary and during speculum exam using scopette, spontaneously resolved [FreeTextEntry8] : Reinforced daily pericare.

## 2021-06-03 NOTE — REASON FOR VISIT
[[employer] ] : provided by [employer]   [Time Spent: ____ minutes] : I have spent [unfilled] minutes of time on the encounter. The patient's primary language is not English thus required  services. [FreeTextEntry1] : [id number] [FreeTextEntry2] : Kathy [TWNoteComboBox1] : Marshallese

## 2021-06-03 NOTE — PHYSICAL EXAM
[No Acute Distress] : in no acute distress [Well developed] : well developed [Well Nourished] : ~L well nourished [Good Hygeine] : demonstrates good hygeine [Oriented x3] : oriented to person, place, and time [Normal Memory] : ~T memory was ~L unimpaired [Normal Mood/Affect] : mood and affect are normal [Soft, Nontender] : the abdomen was soft and nontender [Warm and Dry] : was warm and dry to touch [Normal Gait] : gait was normal [Vulvar Atrophy] : vulvar atrophy [Labia Majora] : were normal [Labia Minora] : were normal [Normal] : was normal [Atrophy] : atrophy [Erythematous] : erythema [Cystocele] : a cystocele [Discharge] : a  ~M vaginal discharge was present [Scant] : scant [Clear] : clear [No Bleeding] : there was no active vaginal bleeding [Anxiety] : patient is not anxious [Tenderness] : ~T no ~M abdominal tenderness observed [Distended] : not distended [de-identified] : no visible prolapse or pessary bulging at introitus [FreeTextEntry4] : pessary intact in posterior vault

## 2021-06-09 ENCOUNTER — APPOINTMENT (OUTPATIENT)
Dept: DERMATOLOGY | Facility: CLINIC | Age: 71
End: 2021-06-09
Payer: MEDICARE

## 2021-06-09 ENCOUNTER — LABORATORY RESULT (OUTPATIENT)
Age: 71
End: 2021-06-09

## 2021-06-09 DIAGNOSIS — D48.5 NEOPLASM OF UNCERTAIN BEHAVIOR OF SKIN: ICD-10-CM

## 2021-06-09 PROCEDURE — 99214 OFFICE O/P EST MOD 30 MIN: CPT | Mod: 25

## 2021-06-09 PROCEDURE — 11104 PUNCH BX SKIN SINGLE LESION: CPT

## 2021-06-09 RX ORDER — HALOBETASOL PROPIONATE 0.5 MG/G
0.05 OINTMENT TOPICAL
Qty: 1 | Refills: 0 | Status: ACTIVE | COMMUNITY
Start: 2021-06-09 | End: 1900-01-01

## 2021-06-16 ENCOUNTER — APPOINTMENT (OUTPATIENT)
Dept: VASCULAR SURGERY | Facility: CLINIC | Age: 71
End: 2021-06-16
Payer: MEDICARE

## 2021-06-16 VITALS — HEART RATE: 67 BPM | DIASTOLIC BLOOD PRESSURE: 86 MMHG | SYSTOLIC BLOOD PRESSURE: 143 MMHG

## 2021-06-16 DIAGNOSIS — I87.2 VENOUS INSUFFICIENCY (CHRONIC) (PERIPHERAL): ICD-10-CM

## 2021-06-16 PROCEDURE — 99213 OFFICE O/P EST LOW 20 MIN: CPT

## 2021-06-16 NOTE — PHYSICAL EXAM
[JVD] : no jugular venous distention  [2+] : left 2+ [Ankle Swelling (On Exam)] : not present [Varicose Veins Of Lower Extremities] : present [Ankle Swelling On The Left] : moderate [Varicose Veins Of The Right Leg] : of the right leg [] : not present [No Rash or Lesion] : No rash or lesion [Skin Ulcer] : no ulcer [Alert] : alert [Calm] : calm [de-identified] : appears well  [de-identified] : no calf tenderness [de-identified] : sensation intact b/l lower extremities

## 2021-06-16 NOTE — HISTORY OF PRESENT ILLNESS
[FreeTextEntry1] : 69 yo female with a history of htn, hld, dm presents for follow up of painful varicose veins in the foot.  pt reports in the legs with improvement with compression stockings but she wears the compression stockings when she starts to feel the pain.  pt is concerned about bleeding from the veins in the feet

## 2021-06-16 NOTE — ASSESSMENT
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

## 2021-06-23 ENCOUNTER — APPOINTMENT (OUTPATIENT)
Dept: DERMATOLOGY | Facility: CLINIC | Age: 71
End: 2021-06-23
Payer: MEDICARE

## 2021-06-23 VITALS — BODY MASS INDEX: 24.99 KG/M2 | HEIGHT: 65 IN | WEIGHT: 150 LBS

## 2021-06-23 DIAGNOSIS — L65.9 NONSCARRING HAIR LOSS, UNSPECIFIED: ICD-10-CM

## 2021-06-23 DIAGNOSIS — L30.9 DERMATITIS, UNSPECIFIED: ICD-10-CM

## 2021-06-23 PROCEDURE — 99214 OFFICE O/P EST MOD 30 MIN: CPT

## 2021-06-25 PROBLEM — L65.9 HAIR LOSS: Status: ACTIVE | Noted: 2021-06-25

## 2021-06-25 PROBLEM — L30.9 DERMATITIS: Status: ACTIVE | Noted: 2021-05-26

## 2021-08-03 ENCOUNTER — APPOINTMENT (OUTPATIENT)
Dept: UROGYNECOLOGY | Facility: CLINIC | Age: 71
End: 2021-08-03

## 2021-08-26 ENCOUNTER — APPOINTMENT (OUTPATIENT)
Dept: UROGYNECOLOGY | Facility: CLINIC | Age: 71
End: 2021-08-26
Payer: MEDICARE

## 2021-08-26 DIAGNOSIS — Z46.89 ENCOUNTER FOR FITTING AND ADJUSTMENT OF OTHER SPECIFIED DEVICES: ICD-10-CM

## 2021-08-26 PROCEDURE — 99213 OFFICE O/P EST LOW 20 MIN: CPT

## 2021-11-19 ENCOUNTER — APPOINTMENT (OUTPATIENT)
Dept: UROGYNECOLOGY | Facility: CLINIC | Age: 71
End: 2021-11-19

## 2022-01-18 ENCOUNTER — APPOINTMENT (OUTPATIENT)
Dept: ORTHOPEDIC SURGERY | Facility: CLINIC | Age: 72
End: 2022-01-18
Payer: MEDICARE

## 2022-01-18 ENCOUNTER — NON-APPOINTMENT (OUTPATIENT)
Age: 72
End: 2022-01-18

## 2022-01-18 VITALS
WEIGHT: 160 LBS | BODY MASS INDEX: 24.25 KG/M2 | HEIGHT: 68 IN | SYSTOLIC BLOOD PRESSURE: 142 MMHG | HEART RATE: 77 BPM | DIASTOLIC BLOOD PRESSURE: 71 MMHG

## 2022-01-18 DIAGNOSIS — M25.571 PAIN IN RIGHT ANKLE AND JOINTS OF RIGHT FOOT: ICD-10-CM

## 2022-01-18 DIAGNOSIS — G89.29 PAIN IN RIGHT ANKLE AND JOINTS OF RIGHT FOOT: ICD-10-CM

## 2022-01-18 PROCEDURE — 99203 OFFICE O/P NEW LOW 30 MIN: CPT

## 2022-01-18 PROCEDURE — 73610 X-RAY EXAM OF ANKLE: CPT | Mod: RT

## 2022-01-18 RX ORDER — ICOSAPENT ETHYL 1000 MG/1
1 CAPSULE ORAL
Qty: 180 | Refills: 0 | Status: ACTIVE | COMMUNITY
Start: 2021-11-29

## 2022-01-18 RX ORDER — LANCETS 33 GAUGE
EACH MISCELLANEOUS
Qty: 100 | Refills: 0 | Status: ACTIVE | COMMUNITY
Start: 2021-08-06

## 2022-01-18 RX ORDER — HYDROCHLOROTHIAZIDE 12.5 MG/1
12.5 CAPSULE ORAL
Qty: 90 | Refills: 0 | Status: ACTIVE | COMMUNITY
Start: 2021-09-09

## 2022-01-18 RX ORDER — LISINOPRIL 40 MG/1
40 TABLET ORAL
Qty: 90 | Refills: 0 | Status: ACTIVE | COMMUNITY
Start: 2021-11-29

## 2022-01-18 RX ORDER — HYDRALAZINE HYDROCHLORIDE 25 MG/1
25 TABLET ORAL
Qty: 90 | Refills: 0 | Status: ACTIVE | COMMUNITY
Start: 2021-11-29

## 2022-01-18 RX ORDER — BLOOD SUGAR DIAGNOSTIC
STRIP MISCELLANEOUS
Qty: 100 | Refills: 0 | Status: ACTIVE | COMMUNITY
Start: 2021-08-06

## 2022-01-18 RX ORDER — METOPROLOL SUCCINATE 50 MG/1
50 TABLET, EXTENDED RELEASE ORAL
Qty: 90 | Refills: 0 | Status: ACTIVE | COMMUNITY
Start: 2021-11-29

## 2022-01-18 RX ORDER — NIFEDIPINE 60 MG/1
60 TABLET, FILM COATED, EXTENDED RELEASE ORAL
Qty: 90 | Refills: 0 | Status: ACTIVE | COMMUNITY
Start: 2021-11-29

## 2022-01-18 RX ORDER — SITAGLIPTIN 100 MG/1
100 TABLET, FILM COATED ORAL
Qty: 90 | Refills: 0 | Status: ACTIVE | COMMUNITY
Start: 2022-01-05

## 2022-01-18 RX ORDER — ATORVASTATIN CALCIUM 40 MG/1
40 TABLET, FILM COATED ORAL
Qty: 90 | Refills: 0 | Status: ACTIVE | COMMUNITY
Start: 2021-11-17

## 2022-01-18 RX ORDER — NIFEDIPINE 30 MG/1
30 TABLET, FILM COATED, EXTENDED RELEASE ORAL
Qty: 90 | Refills: 0 | Status: ACTIVE | COMMUNITY
Start: 2021-09-09

## 2022-01-18 NOTE — HISTORY OF PRESENT ILLNESS
[de-identified] : NERI ENRIQUEZ is a 71 year  female  presenting to the office complaining of right ankle pain.  Patient's primary language is Bahamian. He is accompanied to the office by her daughter who is contributing to her  medical history. She presents to the office ambulating independently.  Patient reports pain intermittently for the past two years.  Denies injury or trauma to the area.  The patient describes the pain as occasional sharp pain localized to the ankle joint itself that is intermittent in nature.  symptoms are exacerbated at night.  Pain is alleviated with rest.  Patient denies instability or  weakness. Denies PMHx of rheumatologic conditions/ autoimmune. 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 \par

## 2022-01-18 NOTE — PHYSICAL EXAM
[de-identified] : Right Lower Extremity\par o Ankle :\par ¦ Inspection/Palpation : mild diffuse tibiotalar joint line tenderness to palpation, no swelling, no deformities\par ¦ Range of Motion : arc of motion full and painless in all planes\par ¦ Stability : no joint instability on provocative testing\par ¦ Strength : all muscles 5/5\par o Muscle Bulk : no atrophy\par o Sensation : sensation intact to light touch\par o Skin : no skin lesions, no discoloration\par o Vascular Exam : no edema, no cyanosis, posterior tibialis and dorsalis pedis pulses normal \par \par Left Lower Extremity\par o Ankle :\par ¦ Inspection/Palpation : no tenderness to palpation, no swelling, no deformities\par ¦ Range of Motion : arc of motion full and painless in all planes\par ¦ Stability : no joint instability on provocative testing\par ¦ Strength : all muscles 5/5\par o Muscle Bulk : no atrophy\par o Sensation : sensation intact to light touch\par o Skin : no skin lesions, no discoloration\par o Vascular Exam : no edema, no cyanosis,  posterior tibialis and dorsalis pedis pulses normal \par  [de-identified] : o Right Ankle : AP, lateral and oblique views were obtained, there are no soft tissue abnormalities, no fractures, alignment is normal, normal appearing joint spaces, normal bone density, no bony lesions, small calcification at the medial malleolus.\par \par

## 2022-01-18 NOTE — CONSULT LETTER
[Dear  ___] : Dear  [unfilled], [Consult Letter:] : I had the pleasure of evaluating your patient, [unfilled]. [Please see my note below.] : Please see my note below. [Consult Closing:] : Thank you very much for allowing me to participate in the care of this patient.  If you have any questions, please do not hesitate to contact me. [Sincerely,] : Sincerely, [FreeTextEntry3] : Dr. Dallas Brooks \par \par

## 2022-01-18 NOTE — DISCUSSION/SUMMARY
[de-identified] : The underlying pathophysiology was reviewed in great detail with the patient as well as the various treatment options, including ice, analgesics, NSAIDs, Physical therapy, steroid injections.\par \par Activity modifications and restrictions were discussed. Discussed the importance of proprioception.\par \par A home exercise sheet was given and discussed with the patient to follow. \par \par A prescription for lab work was provided to rule out an inflammatory disease process. \par \par FU once lab work is obtained. \par \par All questions were answered, all alternatives discussed and the patient is in complete agreement with that plan. Follow-up appointment as instructed. Any issues and the patient will call or come in sooner.

## 2022-01-20 ENCOUNTER — APPOINTMENT (OUTPATIENT)
Dept: UROGYNECOLOGY | Facility: CLINIC | Age: 72
End: 2022-01-20
Payer: MEDICARE

## 2022-01-20 VITALS — TEMPERATURE: 96.8 F

## 2022-01-20 PROCEDURE — 99213 OFFICE O/P EST LOW 20 MIN: CPT

## 2022-01-22 ENCOUNTER — LABORATORY RESULT (OUTPATIENT)
Age: 72
End: 2022-01-22

## 2022-01-25 ENCOUNTER — NON-APPOINTMENT (OUTPATIENT)
Age: 72
End: 2022-01-25

## 2022-02-07 ENCOUNTER — NON-APPOINTMENT (OUTPATIENT)
Age: 72
End: 2022-02-07

## 2022-02-07 LAB
ANA PAT FLD IF-IMP: ABNORMAL
ANA SER IF-ACNC: ABNORMAL
BASOPHILS # BLD AUTO: 0.07 K/UL
BASOPHILS NFR BLD AUTO: 1 %
CRP SERPL-MCNC: <3 MG/L
ENA RNP AB SER IA-ACNC: 0.2 AL
ENA SM AB SER IA-ACNC: <0.2 AL
EOSINOPHIL # BLD AUTO: 0.27 K/UL
EOSINOPHIL NFR BLD AUTO: 3.7 %
ERYTHROCYTE [SEDIMENTATION RATE] IN BLOOD BY WESTERGREN METHOD: 24 MM/HR
HCT VFR BLD CALC: 39.5 %
HGB BLD-MCNC: 12.8 G/DL
HLA-B27 RELATED AG QL: NEGATIVE
IMM GRANULOCYTES NFR BLD AUTO: 0.3 %
LYMPHOCYTES # BLD AUTO: 1.69 K/UL
LYMPHOCYTES NFR BLD AUTO: 23 %
MAN DIFF?: NORMAL
MCHC RBC-ENTMCNC: 29.2 PG
MCHC RBC-ENTMCNC: 32.4 GM/DL
MCV RBC AUTO: 90 FL
MONOCYTES # BLD AUTO: 0.59 K/UL
MONOCYTES NFR BLD AUTO: 8 %
NEUTROPHILS # BLD AUTO: 4.7 K/UL
NEUTROPHILS NFR BLD AUTO: 64 %
PLATELET # BLD AUTO: 259 K/UL
RBC # BLD: 4.39 M/UL
RBC # FLD: 12.4 %
RHEUMATOID FACT SER QL: <10 IU/ML
WBC # FLD AUTO: 7.34 K/UL

## 2022-02-24 ENCOUNTER — EMERGENCY (EMERGENCY)
Facility: HOSPITAL | Age: 72
LOS: 1 days | Discharge: HOME | End: 2022-02-24
Attending: EMERGENCY MEDICINE | Admitting: EMERGENCY MEDICINE
Payer: MEDICARE

## 2022-02-24 VITALS
DIASTOLIC BLOOD PRESSURE: 71 MMHG | HEIGHT: 66 IN | TEMPERATURE: 98 F | SYSTOLIC BLOOD PRESSURE: 150 MMHG | RESPIRATION RATE: 16 BRPM | OXYGEN SATURATION: 98 % | HEART RATE: 72 BPM

## 2022-02-24 PROCEDURE — 99283 EMERGENCY DEPT VISIT LOW MDM: CPT

## 2022-02-24 RX ORDER — LIDOCAINE 4 G/100G
35.44 CREAM TOPICAL
Qty: 35.44 | Refills: 0
Start: 2022-02-24 | End: 2022-02-24

## 2022-02-24 NOTE — ED PROVIDER NOTE - CPE EDP MUSC NORM
Pt placed on 2 L nc per verbal order for pt oxygen saturation on room air ranging from 87-90 %.    normal... no deformity, pain or tenderness. no restriction of movement

## 2022-02-24 NOTE — ED ADULT NURSE NOTE - OBJECTIVE STATEMENT
pt presents to room 10, complaining of right ankle intermittent pain starting 2 years ago. Patient states she had an episode of sharp non radiating pain to right ankle earlier this morning but subsided on its own. Patient able to bear weight, ambulates at this time, not complaining of any pain at this time. A&04, ambulatory at baseline, Montenegrin speaking, Respirations even and unlabored. Lung sounds clear with equal chest rise bilaterally. ABD is soft, non tender, non distended with normal active bowel sounds. skin warm dry and intact. No complaints of chest pain, headache, nausea, dizziness, vomiting  SOB, fever, chills verbalized. awaiting further orders. pt presents to room 10, complaining of right ankle intermittent pain starting 2 years ago. Patient states she had an episode of sharp non radiating pain to right ankle earlier this morning but subsided on its own. Patient able to bear weight, ambulates at this time, not complaining of any pain at this time. No swelling, bleeding, drainage or trauma noted to the ankle. A&04, ambulatory at baseline, Tuvaluan speaking, Respirations even and unlabored. Lung sounds clear with equal chest rise bilaterally. ABD is soft, non tender, non distended with normal active bowel sounds. skin warm dry and intact. No complaints of chest pain, headache, nausea, dizziness, vomiting  SOB, fever, chills verbalized. awaiting further orders.

## 2022-02-24 NOTE — ED PROVIDER NOTE - NSFOLLOWUPINSTRUCTIONS_ED_ALL_ED_FT
You were seen in the Emergency Department for ankle pain.     1) Advance activity as tolerated.   2) Continue all previously prescribed medications as directed.    3) Follow up with your Rheumatologist as soon as possible.   4) Return to the Emergency Department for worsening or persistent symptoms, and/or ANY NEW OR CONCERNING SYMPTOMS.

## 2022-02-24 NOTE — ED PROVIDER NOTE - OBJECTIVE STATEMENT
70yo F presenting with daughter for ankle pain. reports intermittent R ankle pain for the past 2 years. once a month will have episode of approximately 10min R sided ankle pain which is sharp and described as a deep bone pain. seen by orthopedic 3 weeks ago, nml xrays. thought it may be rheumatological and sent autoimmune markers which daughter states some returned positive. has not seen rheum and doesn't have appt yet. today had an episode of pain but additional 2 episodes throughout the morning prompting visit. pt currently with no pain, no swelling, no erythema.

## 2022-02-24 NOTE — ED PROVIDER NOTE - MUSCULOSKELETAL, MLM
Spine appears normal, range of motion is not limited, R ankle with no edema, erythema or ttp, able to range without difficulty, neurovascularly intact.

## 2022-02-24 NOTE — ED ADULT TRIAGE NOTE - CHIEF COMPLAINT QUOTE
C/o chronic ankle pain for 2 yrs. States pain is excruciating when it happens and requests a proper diagnosis/treatment. Denies pain/discomfort at this time. PMH HTN, DM2

## 2022-02-24 NOTE — ED PROVIDER NOTE - CLINICAL SUMMARY MEDICAL DECISION MAKING FREE TEXT BOX
78yo F presenting with intermittent R ankle pain for past 2 years, concern for rheumatological disorder. neg outpt XR, no current pain, neurovascularly intact, able to bear wt. no swelling, erythema, inabilty to range to raise concerns of gout, septic joint. will give rheum f/u and urgent f/u to help arrange appt.

## 2022-02-24 NOTE — ED PROVIDER NOTE - PATIENT PORTAL LINK FT
You can access the FollowMyHealth Patient Portal offered by [facility] by registering at the following website: [email or web address]. By joining [employer]’s FollowMyHealth portal, you will also be able to view your health information using other applications (apps) compatible with our system.

## 2022-02-24 NOTE — ED PROVIDER NOTE - ATTENDING CONTRIBUTION TO CARE
72yo F presenting with daughter for ankle pain. reports intermittent R ankle pain for the past 2 years. once a month will have episode of approximately 10min R sided ankle pain which is sharp and described as a deep bone pain. seen by orthopedic 3 weeks ago, nml xrays. thought it may be rheumatological and sent autoimmune markers which daughter states some returned positive. has not seen rheum and doesn't have appt yet. today had an episode of pain but additional 2 episodes throughout the morning prompting visit. pt currently with no pain, no swelling, no erythema.

## 2022-04-07 ENCOUNTER — APPOINTMENT (OUTPATIENT)
Dept: UROGYNECOLOGY | Facility: CLINIC | Age: 72
End: 2022-04-07

## 2022-07-07 ENCOUNTER — APPOINTMENT (OUTPATIENT)
Dept: UROGYNECOLOGY | Facility: CLINIC | Age: 72
End: 2022-07-07

## 2022-07-07 PROCEDURE — 99213 OFFICE O/P EST LOW 20 MIN: CPT

## 2022-08-25 ENCOUNTER — APPOINTMENT (OUTPATIENT)
Dept: OBGYN | Facility: CLINIC | Age: 72
End: 2022-08-25

## 2022-08-25 VITALS — SYSTOLIC BLOOD PRESSURE: 137 MMHG | DIASTOLIC BLOOD PRESSURE: 78 MMHG | WEIGHT: 170 LBS | BODY MASS INDEX: 25.85 KG/M2

## 2022-08-25 PROCEDURE — G0101: CPT

## 2022-08-25 NOTE — PHYSICAL EXAM
[Appropriately responsive] : appropriately responsive [Alert] : alert [No Acute Distress] : no acute distress [Oriented x3] : oriented x3 [Examination Of The Breasts] : a normal appearance [No Masses] : no breast masses were palpable [Labia Majora] : normal [Labia Minora] : normal [Normal] : normal [Uterine Adnexae] : non-palpable [No Tenderness] : no tenderness [Nl Sphincter Tone] : normal sphincter tone

## 2022-08-25 NOTE — PLAN
[FreeTextEntry1] : 73 y/o  postmenopausal F presents for annual\par \par #HCM\par -no pap indicated\par -f/u mammo\par -f/u colonoscopy\par -f/u DEXA\par \par Pessary removed and then replaced by urogyn.

## 2022-08-25 NOTE — HISTORY OF PRESENT ILLNESS
[Patient reported mammogram was normal] : Patient reported mammogram was normal [Patient reported PAP Smear was normal] : Patient reported PAP Smear was normal [Post-Menopause, No Sxs] : post-menopausal, currently without symptoms [Previously active] : previously active [FreeTextEntry1] :  #[id number]\par \par 73 y/o  postmenopausal F presents for annual\par \par No complaints\par pessary in place  [Mammogramdate] : 2018 [PapSmeardate] : 2017 [TextBox_43] : never

## 2022-10-07 ENCOUNTER — APPOINTMENT (OUTPATIENT)
Dept: UROGYNECOLOGY | Facility: CLINIC | Age: 72
End: 2022-10-07

## 2022-10-14 ENCOUNTER — APPOINTMENT (OUTPATIENT)
Dept: UROGYNECOLOGY | Facility: CLINIC | Age: 72
End: 2022-10-14

## 2022-10-14 PROCEDURE — 99213 OFFICE O/P EST LOW 20 MIN: CPT

## 2022-10-14 NOTE — PHYSICAL EXAM
[No Acute Distress] : in no acute distress [Well developed] : well developed [Well Nourished] : ~L well nourished [Good Hygeine] : demonstrates good hygeine [Oriented x3] : oriented to person, place, and time [Normal Memory] : ~T memory was ~L unimpaired [Normal Mood/Affect] : mood and affect are normal [Soft, Nontender] : the abdomen was soft and nontender [Warm and Dry] : was warm and dry to touch [Normal Gait] : gait was normal [Vulvar Atrophy] : vulvar atrophy [Labia Majora] : were normal [Atrophy] : atrophy [Cystocele] : a cystocele [Discharge] : a  ~M vaginal discharge was present [Scant] : scant [Alexandra] : yellow [No Bleeding] : there was no active vaginal bleeding [Soft] :  the cervix was soft

## 2022-10-17 NOTE — PROCEDURE
[Good Fit] : fits well [Discharge] : there is vaginal discharge [Pessary Inserted] : inserted [Pessary Washed] : washed [H2O] : H2O [None] : no bleeding [Erythema] : no erythema [Infection] : no evidence of infection [FreeTextEntry1] : Ring with support#3 [FreeTextEntry8] : Reinforced daily pericare.

## 2022-10-17 NOTE — DISCUSSION/SUMMARY
[FreeTextEntry1] : Pelvic prolapse:\par -Continue with Ring with support # 3.\par -Precaution and instructions were reviewed.\par \par Vaginal atrophy:\par -PT denies any trouble.  Denies any symptoms.\par \par Follow up in 3 months or sooner. for pelvic prolapse.  IF pt decides to discuss surgery, they are aware they need to do UDS and leave pessary out to re-evaluate prolapse.  IF pt have any problems/concern to call office.  PT and daughter aware and agrees.

## 2022-10-17 NOTE — HISTORY OF PRESENT ILLNESS
[FreeTextEntry1] : Pema, 71y/o presents to the office for follow up on pelvic prolapse supporting with Ring with Support # 3.  She is doing well with support.  Denies any pain, discomfort, or vaginal bleeding.  Voiding and moving BM without problems.\par Pt contemplating about surgery.

## 2023-01-06 ENCOUNTER — APPOINTMENT (OUTPATIENT)
Dept: UROGYNECOLOGY | Facility: CLINIC | Age: 73
End: 2023-01-06
Payer: MEDICARE

## 2023-01-06 PROCEDURE — 99214 OFFICE O/P EST MOD 30 MIN: CPT

## 2023-01-06 NOTE — PROCEDURE
[Refit] : refit needed [Discharge] : there is vaginal discharge [Pessary Inserted] : inserted [H2O] : H2O [None] : no bleeding [Good Fit] : fit is not good [Erythema] : no erythema [Infection] : no evidence of infection [FreeTextEntry1] : Ring with support#3 [de-identified] : Not supportive enough [de-identified] : Donut # 3 [FreeTextEntry8] : Reinforced daily pericare.

## 2023-01-06 NOTE — HISTORY OF PRESENT ILLNESS
[FreeTextEntry1] : Pema, 73y/o presents to the office for follow up on pelvic prolapse supporting with Ring with Support # 3.    Denies any pain, discomfort, or vaginal bleeding.  Voiding and moving BM without problems.\par Lately, she feels the prolapse protruding overlapping pessary when she is on her feet all day.  Pt would like to see if there is a pessary that is more supportive for her prolapse.\par

## 2023-01-06 NOTE — DISCUSSION/SUMMARY
[FreeTextEntry1] : Pelvic prolapse:\par -Refitted with Donut # 3.  --- fell out.   Pt wanted her Ring with support # 3 reinserted instead.\par -Declined next size ring and other types of pessary.\par -Precaution and instructions were reviewed.\par \par Vaginal atrophy:\par -PT denies any trouble.  Denies any symptoms.\par \par Follow up in 2-3 weeks or sooner. for pelvic prolapse.  IF pt have any problems/concern to call office.  PT and daughter aware and agrees.\par \par See Addendum.

## 2023-01-06 NOTE — ADDENDUM
[FreeTextEntry1] : After fitting with Donut # 3, pessary fell out and pt uncomfortable.  She prefers to have Ring with support # 3 reinserted.  Offered to try Ring with support # 4 (may be too big), pt declined.  Offered to try a different pessary such as a Gellhorn, pt declined.  Pt wants to keep the pessary she currently is using.  She will follow up in office 2-3 months for pelvic prolapse.  IF pt have any problems/concern to call office.  PT and daughter aware and agrees.

## 2023-03-31 ENCOUNTER — APPOINTMENT (OUTPATIENT)
Dept: UROGYNECOLOGY | Facility: CLINIC | Age: 73
End: 2023-03-31

## 2023-05-18 ENCOUNTER — APPOINTMENT (OUTPATIENT)
Dept: UROGYNECOLOGY | Facility: CLINIC | Age: 73
End: 2023-05-18
Payer: MEDICARE

## 2023-05-18 VITALS — HEART RATE: 74 BPM | DIASTOLIC BLOOD PRESSURE: 80 MMHG | OXYGEN SATURATION: 97 % | SYSTOLIC BLOOD PRESSURE: 133 MMHG

## 2023-05-18 VITALS — SYSTOLIC BLOOD PRESSURE: 167 MMHG | DIASTOLIC BLOOD PRESSURE: 83 MMHG | OXYGEN SATURATION: 97 % | HEART RATE: 67 BPM

## 2023-05-18 DIAGNOSIS — N95.2 POSTMENOPAUSAL ATROPHIC VAGINITIS: ICD-10-CM

## 2023-05-18 PROCEDURE — 99213 OFFICE O/P EST LOW 20 MIN: CPT

## 2023-05-18 NOTE — DISCUSSION/SUMMARY
[FreeTextEntry1] : Pelvic prolapse:\par -Continue with Ring with support # 3.\par -Precaution and instructions were reviewed.\par \par Vaginal atrophy:\par -PT denies any trouble.  Denies any symptoms.\par \par Pt declined to leave pessary out and wants to reschedule appt for surgeon at a later time.  She is aware she will need to leave pessary out for at least 2 weeks prior to appt.\par \par Follow up in 2-3 months. for pelvic prolapse.  IF pt have any problems/concern to call office.  PT and daughter aware and agrees.\par \par See Addendum.

## 2023-05-18 NOTE — HISTORY OF PRESENT ILLNESS
[FreeTextEntry1] : Pema, 73y/o presents to the office for follow up on pelvic prolapse supporting with Ring with Support # 3.    Denies any pain, discomfort, or vaginal bleeding.  Voiding and moving BM without problems.\par \par Pt made appt with Dr. Gilliam for 5/25/2023 to discuss surgery.

## 2023-05-18 NOTE — PROCEDURE
[Discharge] : there is vaginal discharge [Pessary Inserted] : inserted [Pessary Washed] : washed [H2O] : H2O [None] : no bleeding [Good Fit] : fit is not good [Erythema] : no erythema [Infection] : no evidence of infection [FreeTextEntry1] : Ring with support#3 [FreeTextEntry8] : Reinforced daily pericare.

## 2023-05-25 ENCOUNTER — APPOINTMENT (OUTPATIENT)
Dept: UROGYNECOLOGY | Facility: CLINIC | Age: 73
End: 2023-05-25

## 2024-02-15 NOTE — REVIEW OF SYSTEMS
Detail Level: Detailed
Add 77308 Cpt? (Important Note: In 2017 The Use Of 54721 Is Being Tracked By Cms To Determine Future Global Period Reimbursement For Global Periods): no
[All Other ROS] : all other reviewed systems are negative

## 2024-02-16 ENCOUNTER — APPOINTMENT (OUTPATIENT)
Dept: NEUROLOGY | Facility: CLINIC | Age: 74
End: 2024-02-16

## 2024-04-18 ENCOUNTER — APPOINTMENT (OUTPATIENT)
Dept: UROGYNECOLOGY | Facility: CLINIC | Age: 74
End: 2024-04-18
Payer: MEDICARE

## 2024-04-18 DIAGNOSIS — N81.6 RECTOCELE: ICD-10-CM

## 2024-04-18 DIAGNOSIS — N81.11 CYSTOCELE, MIDLINE: ICD-10-CM

## 2024-04-18 PROCEDURE — G2211 COMPLEX E/M VISIT ADD ON: CPT

## 2024-04-18 PROCEDURE — 99213 OFFICE O/P EST LOW 20 MIN: CPT

## 2024-04-18 NOTE — HISTORY OF PRESENT ILLNESS
[FreeTextEntry1] : Pema, 72 y/o presents to the office for follow up on pelvic prolapse supporting with Ring with Support # 3.   Denies any pain, discomfort, or vaginal bleeding.  Voiding and moving BM without problems.

## 2024-04-18 NOTE — PHYSICAL EXAM
[No Acute Distress] : in no acute distress [Well developed] : well developed [Well Nourished] : ~L well nourished [Good Hygeine] : demonstrates good hygeine [Oriented x3] : oriented to person, place, and time [Normal Memory] : ~T memory was ~L unimpaired [Normal Mood/Affect] : mood and affect are normal [Warm and Dry] : was warm and dry to touch [Normal Gait] : gait was normal [Vulvar Atrophy] : vulvar atrophy [Labia Majora] : were normal [Atrophy] : atrophy [Erythematous] : erythema [Cystocele] : a cystocele [Discharge] : a  ~M vaginal discharge was present [Alexandra] : yellow [Thin] : thin [Scant] : there was scant vaginal bleeding [Soft] :  the cervix was soft [Anxiety] : patient is not anxious [FreeTextEntry4] : Scant bleeding noted with pessary care and speculum exam most likely due to irritation from pessary; stopped with pressure

## 2024-04-18 NOTE — PROCEDURE
[Good Fit] : fits well [Erythema] : erythema noted [Discharge] : there is vaginal discharge [Pessary Inserted] : inserted [Pessary Washed] : washed [H2O] : H2O [Mild] : mild bleeding [Resolved w/pressure] : was resolved by applying pressure [Refit] : refit is not needed [Erosion] : no evidence of erosion [Infection] : no evidence of infection [FreeTextEntry1] : Ring with support#3 [de-identified] : Small area at posterior and left vaginal wall  [de-identified] : Scant bleeding noted with pessary care and speculum exam most likely due to irritation from pessary; stopped with pressure [FreeTextEntry8] : Reinforced daily pericare.

## 2024-04-18 NOTE — DISCUSSION/SUMMARY
[FreeTextEntry1] : Pelvic prolapse: -Continue with Ring with support # 3. -Precaution and instructions were reviewed. -Recommended pt to use Replens or Luvena twice a week at bedtime for lubrication   Follow up in 3 months or sooner for pelvic prolapse.  If pt have any problems/concern to call office.  Pt and daughter aware and agree.

## 2024-07-18 ENCOUNTER — APPOINTMENT (OUTPATIENT)
Dept: UROGYNECOLOGY | Facility: CLINIC | Age: 74
End: 2024-07-18

## 2025-03-07 ENCOUNTER — APPOINTMENT (OUTPATIENT)
Dept: UROGYNECOLOGY | Facility: CLINIC | Age: 75
End: 2025-03-07
Payer: MEDICARE

## 2025-03-07 DIAGNOSIS — N81.6 RECTOCELE: ICD-10-CM

## 2025-03-07 DIAGNOSIS — N81.11 CYSTOCELE, MIDLINE: ICD-10-CM

## 2025-03-07 PROCEDURE — 99213 OFFICE O/P EST LOW 20 MIN: CPT

## 2025-03-07 PROCEDURE — G2211 COMPLEX E/M VISIT ADD ON: CPT

## 2025-06-06 ENCOUNTER — APPOINTMENT (OUTPATIENT)
Dept: UROGYNECOLOGY | Facility: CLINIC | Age: 75
End: 2025-06-06